# Patient Record
Sex: FEMALE | Race: OTHER | Employment: UNEMPLOYED | URBAN - METROPOLITAN AREA
[De-identification: names, ages, dates, MRNs, and addresses within clinical notes are randomized per-mention and may not be internally consistent; named-entity substitution may affect disease eponyms.]

---

## 2017-11-28 ENCOUNTER — OFFICE VISIT (OUTPATIENT)
Dept: INTERNAL MEDICINE CLINIC | Age: 37
End: 2017-11-28

## 2017-11-28 VITALS
HEART RATE: 86 BPM | OXYGEN SATURATION: 100 % | TEMPERATURE: 97.1 F | BODY MASS INDEX: 44.51 KG/M2 | DIASTOLIC BLOOD PRESSURE: 54 MMHG | WEIGHT: 283.6 LBS | RESPIRATION RATE: 24 BRPM | HEIGHT: 67 IN | SYSTOLIC BLOOD PRESSURE: 120 MMHG

## 2017-11-28 DIAGNOSIS — S41.151A: ICD-10-CM
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
## 2017-11-28 DIAGNOSIS — M54.6 ACUTE MIDLINE THORACIC BACK PAIN: ICD-10-CM

## 2017-11-28 DIAGNOSIS — Z23 NEED FOR TDAP VACCINATION: ICD-10-CM

## 2017-11-28 PROBLEM — E66.01 OBESITY, MORBID (HCC): Status: ACTIVE | Noted: 2017-11-28

## 2017-11-28 RX ORDER — RIZATRIPTAN BENZOATE 10 MG/1
TABLET, ORALLY DISINTEGRATING ORAL
Refills: 1 | COMMUNITY
Start: 2017-11-21

## 2017-11-28 RX ORDER — TIZANIDINE 4 MG/1
TABLET ORAL
Refills: 3 | COMMUNITY
Start: 2017-11-20

## 2017-11-28 RX ORDER — ESZOPICLONE 3 MG/1
TABLET, FILM COATED ORAL
Refills: 1 | COMMUNITY
Start: 2017-11-13

## 2017-11-28 RX ORDER — SULFAMETHOXAZOLE AND TRIMETHOPRIM 800; 160 MG/1; MG/1
1 TABLET ORAL 2 TIMES DAILY
Qty: 6 TAB | Refills: 0 | Status: SHIPPED | OUTPATIENT
Start: 2017-11-28 | End: 2017-12-01

## 2017-11-28 RX ORDER — METRONIDAZOLE 500 MG/1
500 TABLET ORAL 3 TIMES DAILY
Qty: 9 TAB | Refills: 0 | Status: SHIPPED | OUTPATIENT
Start: 2017-11-28 | End: 2017-12-01

## 2017-11-28 RX ORDER — FUROSEMIDE 40 MG/1
TABLET ORAL
Refills: 3 | COMMUNITY
Start: 2017-11-10

## 2017-11-28 RX ORDER — QUETIAPINE FUMARATE 200 MG/1
TABLET, FILM COATED ORAL
Refills: 2 | COMMUNITY
Start: 2017-11-03

## 2017-11-28 RX ORDER — GABAPENTIN 300 MG/1
CAPSULE ORAL
Refills: 11 | COMMUNITY
Start: 2017-10-28

## 2017-11-28 RX ORDER — TRAZODONE HYDROCHLORIDE 100 MG/1
TABLET ORAL
Refills: 2 | COMMUNITY
Start: 2017-11-10

## 2017-11-28 RX ORDER — IBUPROFEN 800 MG/1
TABLET ORAL
Refills: 1 | COMMUNITY
Start: 2017-11-21

## 2017-11-28 RX ORDER — LAMOTRIGINE 100 MG/1
TABLET ORAL
Refills: 2 | COMMUNITY
Start: 2017-11-09
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
## 2017-11-28 NOTE — PATIENT INSTRUCTIONS
Human Bites: Care Instructions  Your Care Instructions  The biggest danger from a human bite is that it might get infected. Usually the wound will not be stitched. Taking good care of your wound at home will help it heal and reduce your chance of infection. Your doctor may give you antibiotics to prevent infection and a tetanus shot if you have not had one in the last 5 years or do not know when you had your last one. Your wound may heal in less than a week, or it may take longer, depending on how bad it is. The larger it is, the longer it will take to heal.  The doctor has checked you carefully, but problems can develop later. If you notice any problems or new symptoms, get medical treatment right away. Follow-up care is a key part of your treatment and safety. Be sure to make and go to all appointments, and call your doctor if you are having problems. It's also a good idea to know your test results and keep a list of the medicines you take. How can you care for yourself at home? · If your doctor told you how to care for your wound, follow your doctor's instructions. If you did not get instructions, follow this general advice:  ¨ Wash the wound with clean water 2 times a day. Don't use hydrogen peroxide or alcohol, which can slow healing. ¨ You may cover the wound with a thin layer of petroleum jelly, such as Vaseline, and a nonstick bandage. ¨ Apply more petroleum jelly and replace the bandage as needed. · Your wound may itch or feel irritated. A little redness and swelling are normal. Do not scratch or rub the wound. · If your doctor prescribed antibiotics, take them as directed. Do not stop taking them just because you feel better. You need to take the full course of antibiotics. · Ask your doctor if you can take an over-the-counter pain medicine. When should you call for help? Call your doctor now or seek immediate medical care if:  ?  · The skin near the bite turns cold or pale or it changes color.   ? · You lose feeling in the area near the bite, or it feels numb or tingly. ? · You have trouble moving a limb near the bite. ? · You have symptoms of infection, such as:  ¨ Increased pain, swelling, warmth, or redness near the wound. ¨ Red streaks leading from the wound. ¨ Pus draining from the wound. ¨ A fever. ? · Blood soaks through the bandage. Oozing small amounts of blood is normal.   ? · Your pain is getting worse. ? Watch closely for changes in your health, and be sure to contact your doctor if you are not getting better as expected. Where can you learn more? Go to http://katelyn-sara.info/. Enter E545 in the search box to learn more about \"Human Bites: Care Instructions. \"  Current as of: 2017  Content Version: 11.4  © 4728-8553 [employer]. Care instructions adapted under license by [employer] (which disclaims liability or warranty for this information). If you have questions about a medical condition or this instruction, always ask your healthcare professional. [address] any warranty or liability for your use of this information. Tdap (Tetanus, Diphtheria, Pertussis) Vaccine: What You Need to Know  Why get vaccinated? Tetanus, diphtheria, and pertussis are very serious diseases. Tdap vaccine can protect us from these diseases. And Tdap vaccine given to pregnant women can protect  babies against pertussis. Tetanus (lockjaw) is rare in the [facility] today. It causes painful muscle tightening and stiffness, usually all over the body. · It can lead to tightening of muscles in the head and neck so you can't open your mouth, swallow, or sometimes even breathe. Tetanus kills about 1 out of 10 people who are infected even after receiving the best medical care. Diphtheria is also rare in the [place] today. It can cause a thick coating to form in the back of the throat.   · It can lead to breathing problems, heart failure, paralysis, and death. Pertussis (whooping cough) causes severe coughing spells, which can cause difficulty breathing, vomiting, and disturbed sleep. · It can also lead to weight loss, incontinence, and rib fractures. Up to 2 in 100 adolescents and 5 in 100 adults with pertussis are hospitalized or have complications, which could include pneumonia or death. These diseases are caused by bacteria. Diphtheria and pertussis are spread from person to person through secretions from coughing or sneezing. Tetanus enters the body through cuts, scratches, or wounds. Before vaccines, as many as 200,000 cases of diphtheria, 200,000 cases of pertussis, and hundreds of cases of tetanus were reported in the [place] each year. Since vaccination began, reports of cases for tetanus and diphtheria have dropped by about 99% and for pertussis by about 80%. Tdap vaccine  The Tdap vaccine can protect adolescents and adults from tetanus, diphtheria, and pertussis. One dose of Tdap is routinely given at age 6 or 15. People who did not get Tdap at that age should get it as soon as possible. Tdap is especially important for health care professionals and anyone having close contact with a baby younger than 12 months. Pregnant women should get a dose of Tdap during every pregnancy, to protect the  from pertussis. Infants are most at risk for severe, life-threatening complications from pertussis. Another vaccine, called Td, protects against tetanus and diphtheria, but not pertussis. A Td booster should be given every 10 years. Tdap may be given as one of these boosters if you have never gotten Tdap before. Tdap may also be given after a severe cut or burn to prevent tetanus infection. Your doctor or the person giving you the vaccine can give you more information. Tdap may safely be given at the same time as other vaccines.   Some people should not get this vaccine  · A person who has ever had a life-threatening allergic reaction after a previous dose of any diphtheria-, tetanus-, or pertussis-containing vaccine, OR has a severe allergy to any part of this vaccine, should not get Tdap vaccine. Tell the person giving the vaccine about any severe allergies. · Anyone who had coma or long repeated seizures within 7 days after a childhood dose of DTP or DTaP, or a previous dose of Tdap, should not get Tdap, unless a cause other than the vaccine was found. They can still get Td. · Talk to your doctor if you:  ¨ Have seizures or another nervous system problem. ¨ Had severe pain or swelling after any vaccine containing diphtheria, tetanus, or pertussis. ¨ Ever had a condition called Guillain-Barré Syndrome (GBS). ¨ Aren't feeling well on the day the shot is scheduled. Risks  With any medicine, including vaccines, there is a chance of side effects. These are usually mild and go away on their own. Serious reactions are also possible but are rare. Most people who get Tdap vaccine do not have any problems with it.   Mild problems following Tdap  (Did not interfere with activities)  · Pain where the shot was given (about 3 in 4 adolescents or 2 in 3 adults)  · Redness or swelling where the shot was given (about 1 person in 5)  · Mild fever of at least 100.4°F (up to about 1 in 25 adolescents or 1 in 100 adults)  · Headache (about 3 or 4 people in 10)  · Tiredness (about 1 person in 3 or 4)  · Nausea, vomiting, diarrhea, stomachache (up to 1 in 4 adolescents or 1 in 10 adults)  · Chills, sore joints (about 1 person in 10)  · Body aches (about 1 person in 3 or 4)  · Rash, swollen glands (uncommon)  Moderate problems following Tdap  (Interfered with activities, but did not require medical attention)  · Pain where the shot was given (up to 1 in 5 or 6)  · Redness or swelling where the shot was given (up to about 1 in 16 adolescents or 1 in 12 adults)  · Fever over 102°F (about 1 in 100 adolescents or 1 in 250 adults)  · Headache (about 1 in 7 adolescents or 1 in 10 adults)  · Nausea, vomiting, diarrhea, stomachache (up to 1 to 3 people in 100)  · Swelling of the entire arm where the shot was given (up to about 1 in 500)  Severe problems following Tdap  (Unable to perform usual activities; required medical attention)  · Swelling, severe pain, bleeding and redness in the arm where the shot was given (rare)  Problems that could happen after any vaccine:  · People sometimes faint after a medical procedure, including vaccination. Sitting or lying down for about 15 minutes can help prevent fainting, and injuries caused by a fall. Tell your doctor if you feel dizzy or have vision changes or ringing in the ears. · Some people get severe pain in the shoulder and have difficulty moving the arm where a shot was given. This happens very rarely. · Any medication can cause a severe allergic reaction. Such reactions from a vaccine are very rare, estimated at fewer than 1 in a million doses, and would happen within a few minutes to a few hours after the vaccination. As with any medicine, there is a very remote chance of a vaccine causing a serious injury or death. The safety of vaccines is always being monitored. For more information, visit: www.cdc.gov/vaccinesafety. What if there is a serious problem? What should I look for? · Look for anything that concerns you, such as signs of a severe allergic reaction, very high fever, or unusual behavior. Signs of a severe allergic reaction can include hives, swelling of the face and throat, difficulty breathing, a fast heartbeat, dizziness, and weakness. These would usually start a few minutes to a few hours after the vaccination. What should I do? · If you think it is a severe allergic reaction or other emergency that can't wait, call 9-1-1 or get the person to the nearest hospital. Otherwise, call your doctor.   · Afterward, the reaction should be reported to the Vaccine Adverse Event Reporting System (VAERS). Your doctor might file this report, or you can do it yourself through the VAERS web site at www.vaers. [facility].gov, or by calling 5-300.922.5026. VAERS does not give medical advice. The National Vaccine Injury Compensation Program  The National Vaccine Injury Compensation Program (VICP) is a federal program that was created to compensate people who may have been injured by certain vaccines. Persons who believe they may have been injured by a vaccine can learn about the program and about filing a claim by calling 1-780.900.9122 or visiting the [employer] website at www.[facility].gov/vaccinecompensation. There is a time limit to file a claim for compensation. How can I learn more? · Ask your doctor. He or she can give you the vaccine package insert or suggest other sources of information. · Call your local or state health department. · Contact the Centers for Disease Control and Prevention (CDC):  ¨ Call 1-800.818.5351 (1-800-CDC-INFO) or  ¨ Visit CDC's website at www.cdc.gov/vaccines  Vaccine Information Statement (Interim)  Tdap Vaccine  (2/24/15)  [address] [id number]-81  Department of Health and Human Services  Centers for Disease Control and Prevention  Many Vaccine Information Statements are available in [place]ian and other languages. See www.immunize.org/vis. Muchas hojas de información sobre vacunas están disponibles en español y en otros idiomas. Visite www.immunize.org/vis. Care instructions adapted under license by [employer] (which disclaims liability or warranty for this information). If you have questions about a medical condition or this instruction, always ask your healthcare professional. [place] [address] any warranty or liability for your use of this information. Back Pain: Care Instructions  Your Care Instructions    Back pain has many possible causes. It is often related to problems with muscles and ligaments of the back.  It may also be related to problems with the nerves, discs, or bones of the back. Moving, lifting, standing, sitting, or sleeping in an awkward way can strain the back. Sometimes you don't notice the injury until later. Arthritis is another common cause of back pain. Although it may hurt a lot, back pain usually improves on its own within several weeks. Most people recover in 12 weeks or less. Using good home treatment and being careful not to stress your back can help you feel better sooner. Follow-up care is a key part of your treatment and safety. Be sure to make and go to all appointments, and call your doctor if you are having problems. It's also a good idea to know your test results and keep a list of the medicines you take. How can you care for yourself at home? · Sit or lie in positions that are most comfortable and reduce your pain. Try one of these positions when you lie down:  ¨ Lie on your back with your knees bent and supported by large pillows. ¨ Lie on the floor with your legs on the seat of a sofa or chair. Brian Dys on your side with your knees and hips bent and a pillow between your legs. ¨ Lie on your stomach if it does not make pain worse. · Do not sit up in bed, and avoid soft couches and twisted positions. Bed rest can help relieve pain at first, but it delays healing. Avoid bed rest after the first day of back pain. · Change positions every 30 minutes. If you must sit for long periods of time, take breaks from sitting. Get up and walk around, or lie in a comfortable position. · Try using a heating pad on a low or medium setting for 15 to 20 minutes every 2 or 3 hours. Try a warm shower in place of one session with the heating pad. · You can also try an ice pack for 10 to 15 minutes every 2 to 3 hours. Put a thin cloth between the ice pack and your skin. · Take pain medicines exactly as directed. ¨ If the doctor gave you a prescription medicine for pain, take it as prescribed.   ¨ If you are not taking a prescription pain medicine, ask your doctor if you can take an over-the-counter medicine. · Take short walks several times a day. You can start with 5 to 10 minutes, 3 or 4 times a day, and work up to longer walks. Walk on level surfaces and avoid hills and stairs until your back is better. · Return to work and other activities as soon as you can. Continued rest without activity is usually not good for your back. · To prevent future back pain, do exercises to stretch and strengthen your back and stomach. Learn how to use good posture, safe lifting techniques, and proper body mechanics. When should you call for help? Call your doctor now or seek immediate medical care if:  ? · You have new or worsening numbness in your legs. ? · You have new or worsening weakness in your legs. (This could make it hard to stand up.)   ? · You lose control of your bladder or bowels. ? Watch closely for changes in your health, and be sure to contact your doctor if:  ? · Your pain gets worse. ? · You are not getting better after 2 weeks. Where can you learn more? Go to http://katelyn-sara.info/. Enter C573 in the search box to learn more about \"Back Pain: Care Instructions. \"  Current as of: March 21, 2017  Content Version: 11.4  © 1455-0651 [employer]. Care instructions adapted under license by [employer] (which disclaims liability or warranty for this information). If you have questions about a medical condition or this instruction, always ask your healthcare professional. [place] [address] any warranty or liability for your use of this information.
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
## 2020-04-01 ENCOUNTER — HOSPITAL ENCOUNTER (OUTPATIENT)
Dept: PHYSICAL THERAPY | Age: 40
Discharge: HOME | End: 2020-04-01
Payer: MEDICAID

## 2020-04-01 PROCEDURE — 97162 PT EVAL MOD COMPLEX 30 MIN: CPT | Performed by: PHYSICAL THERAPIST

## 2020-04-01 PROCEDURE — 97530 THERAPEUTIC ACTIVITIES: CPT | Performed by: PHYSICAL THERAPIST

## 2020-04-01 NOTE — PROGRESS NOTES
[facility] [address] [place] [address]  [facility], [address] [place] [address], [address]  Phone: [phone]  Fax: [phone] / STATEMENT OF MEDICAL NECESSITY FOR PHYSICAL THERAPY SERVICES  Patient Name: Josse Ann : 1980   Medical   Diagnosis: Neck pain [M54.2] Treatment Diagnosis: Neck Pain   Onset Date: > 5 years prior without an known incident     Referral Source: [facility]): 2020   Prior Hospitalization: See medical history Provider #: [id number]   Prior Level of Function: Unlimited standing/walking/sitting    Comorbidities: Bipolar disorder, obesity, anxiety, insomnia   Medications: Verified on Patient Summary List   The Plan of Care and following information is based on the information from the initial evaluation.   ===========================================================================================  Assessment / key information:  38 y/o female presents to PT with c/o neck and back pain for the past 5 years. She has been seeing pain management at [facility], and reports recently finding an oral opioid management regime. Pt rates her pain as 7/10 at times of evaluation. Postural inspection reveals forward head, rounded shoulder posture and forward lean of trunk in standing. She sits with a posterior pelvic tilt. Cervical AROM is painfully limited in in all directions to ~25% of normal.  Shoulder AROM is limited to 120 degrees of elevation bilaterally due to neck pain; functional IR/ER were full, but painful at end ranges. Lumbar AROM assessment revealed 50% flexion and bilateral sidebending, but extension was limited to 10% due to pain. Pt was educated in proper sitting posture to alleviate lumbar and cervical stresses when sitting. Ms Maia Ba has signs and symptoms suggestive of chronic neck and back pain related to underlying postural dysfunction.   Due to current pandemic, pt will not been seen formally at this time, but followed with periodic phone calls.  ===========================================================================================  Eval Complexity: History HIGH Complexity :3+ comorbidities / personal factors will impact the outcome/ POC ;  Examination  MEDIUM Complexity : 3 Standardized tests and measures addressing body structure, function, activity limitation and / or participation in recreation ; Presentation MEDIUM Complexity : Evolving with changing characteristics ; Decision Making MEDIUM Complexity : FOTO score of 26-74; Overall Complexity MEDIUM  Problem List: pain affecting function, decrease ROM, decrease strength, impaired gait/ balance, decrease ADL/ functional abilitiies, decrease activity tolerance, decrease flexibility/ joint mobility and decrease transfer abilities   Treatment Plan may include any combination of the following: Therapeutic exercise, Therapeutic activities, Neuromuscular re-education, Physical agent/modality, Gait/balance training, Manual therapy, Dry Needling, Aquatic therapy, Patient education, Self Care training, Functional mobility training, Home safety training and Stair training  Patient / Family readiness to learn indicated by: asking questions, trying to perform skills and interest  Persons(s) to be included in education: patient (P)  Barriers to Learning/Limitations: None  Measures taken:    Patient Goal (s): \"make pain a bit more manageable\"   Patient self reported health status: good  Rehabilitation Potential: good   Short Term Goals: To be accomplished in  2  weeks:  1. Pt to manage pain to </= 5/10 with postrual correction and current medicaitons. 2. Pt independent with basic postural correction, body mechanics, and core strengthening.  Long Term Goals: To be accomplished in  6  weeks:  1. Pt to increase FOTO score from 30 to >/= 44.  2. Pt to report ability to stand and perform ADLs with pain </= 3/10.   3. Pt independent with high level HEP for core strengthening/endurance, and body mechanics to manage symptoms. Frequency / Duration:   **Patient services to held at this time due to COVID-19 pandemic and patient will be contacted periodically via phone to monitor condition, with plan to start formal PT once determined safe **     Patient / Caregiver education and instruction: self care, activity modification and exercises    Therapist Signature: Jeff Pérez, PT Date: 4/7/7112   Certification Period: NA Time: 2:03 PM   ===========================================================================================  I certify that the above Physical Therapy Services are being furnished while the patient is under my care. I agree with the treatment plan and certify that this therapy is necessary. Physician Signature:        Date:       Time:     Please sign and return to [facility] [place] or you may fax the signed copy to [phone].   Thank you.  paige

## 2020-04-01 NOTE — PROGRESS NOTES
PHYSICAL THERAPY - DAILY TREATMENT NOTE    Patient Name: Blank Fill        Date: 2020  : 1980   YES Patient  Verified  Visit #:      of   12  Insurance: Payor: Kerwin Salguero / Plan: Fabi Broussard / Product Type: Managed Care Medicaid /      In time: 2:00 Out time: 2:55   Total Treatment Time: 55     BCBS/Medicare Time Tracking (below)   Total Timed Codes (min):  na 1:1 Treatment Time:  na     TREATMENT AREA =  Neck pain [M54.2]    SUBJECTIVE  Pain Level (on 0 to 10 scale):  7  / 10   Medication Changes/New allergies or changes in medical history, any new surgeries or procedures?     NO    If yes, update Summary List   Subjective Functional Status/Changes:  []  No changes reported     See eval/POC           Modalities Rationale:   NA   min [] Estim, type/location:                                      []  att     []  unatt     []  w/US     []  w/ice    []  w/heat    min []  Mechanical Traction: type/lbs                   []  pro   []  sup   []  int   []  cont    []  before manual    []  after manual    min []  Ultrasound, settings/location:      min []  Iontophoresis w/ dexamethasone, location:                                               []  take home patch       []  in clinic    min []  Ice     []  Heat    location/position:     min []  Vasopneumatic Device, press/temp:     min []  Other:    [] Skin assessment post-treatment (if applicable):    []  intact    []  redness- no adverse reaction     []redness - adverse reaction:        15 min Therapeutic Activity:  [x]  Sitting posture with lumbar roll and supporting UEs to unload C/T myofacsia   Rationale:      improve coordination to improve the patients ability to increase positional/movement tolerance       Billed With/As:   [] TE   [] TA   [] Neuro   [] Self Care Patient Education: [x] Review HEP    [] Progressed/Changed HEP based on:   [] positioning   [] body mechanics   [] transfers   [] heat/ice application    [] other:      Other Objective/Functional Measures:    FOTO - 30     Post Treatment Pain Level (on 0 to 10) scale:   7  / 10     ASSESSMENT  Assessment/Changes in Function:     Pt has signs and symptoms suegestive of chronic pain related to underlying obesity and postural dysfunction. []  See Progress Note/Recertification   Patient will continue to benefit from skilled PT services to modify and progress therapeutic interventions, address functional mobility deficits, address ROM deficits, address strength deficits, analyze and address soft tissue restrictions, analyze and cue movement patterns, analyze and modify body mechanics/ergonomics and assess and modify postural abnormalities to attain remaining goals. Progress toward goals / Updated goals:    Goals established today     PLAN  [x]  Upgrade activities as tolerated YES Continue plan of care   []  Discharge due to :    []  Other:      Therapist: Tamir Sullivan PT    Date: 4/1/2020 Time: 2:04 PM     No future appointments.

## 2020-07-01 ENCOUNTER — HOSPITAL ENCOUNTER (OUTPATIENT)
Dept: PHYSICAL THERAPY | Age: 40
Discharge: HOME | End: 2020-07-01
Payer: MEDICAID

## 2020-07-01 PROCEDURE — 97110 THERAPEUTIC EXERCISES: CPT | Performed by: PHYSICAL THERAPIST

## 2020-07-01 PROCEDURE — 97162 PT EVAL MOD COMPLEX 30 MIN: CPT | Performed by: PHYSICAL THERAPIST

## 2020-07-01 NOTE — PROGRESS NOTES
PHYSICAL THERAPY - DAILY TREATMENT NOTE    Patient Name: Taylor Castillo        Date: 2020  : 1980   YES Patient  Verified  Visit #:      12  Insurance: Payor: Bunny Krabbe / Plan: [email or web address] / Product Type: Managed Care Medicaid /      In time: 3:50 Out time: 4:50   Total Treatment Time: 60     BCBS/Medicare Time Tracking (below)   Total Timed Codes (min):  na 1:1 Treatment Time:  na     TREATMENT AREA =  Neck pain [M54.2]    SUBJECTIVE  Pain Level (on 0 to 10 scale):  6-9  / 10   Medication Changes/New allergies or changes in medical history, any new surgeries or procedures?     NO    If yes, update Summary List   Subjective Functional Status/Changes:  []  No changes reported     See eval/POC           Modalities Rationale:     decrease pain and increase tissue extensibility to improve patient's ability to prevent soreness   min [] Estim, type/location:                                      []  att     []  unatt     []  w/US     []  w/ice    []  w/heat    min []  Mechanical Traction: type/lbs                   []  pro   []  sup   []  int   []  cont    []  before manual    []  after manual    min []  Ultrasound, settings/location:      min []  Iontophoresis w/ dexamethasone, location:                                               []  take home patch       []  in clinic   10 min []  Ice     [x]  Heat    location/position: Neck/Back - reclined after session    min []  Vasopneumatic Device, press/temp:     min []  Other:    [] Skin assessment post-treatment (if applicable):    []  intact    []  redness- no adverse reaction     []redness  adverse reaction:        15 min Therapeutic Exercise:  [x]  See flow sheet   Rationale:      increase ROM, increase strength and improve coordination to improve the patients ability to self-correct posture       Billed With/As:   [] TE   [] TA   [] Neuro   [] Self Care Patient Education: [x] Review HEP    [] Progressed/Changed HEP based on:   [] positioning   [] body mechanics   [] transfers   [] heat/ice application    [] other:      Other Objective/Functional Measures:    FOTO - 29     Post Treatment Pain Level (on 0 to 10) scale:   6  / 10     ASSESSMENT  Assessment/Changes in Function:     Pt has chronic neck/back pain related to deconditioning and postural dysfunction. []  See Progress Note/Recertification   Patient will continue to benefit from skilled PT services to modify and progress therapeutic interventions, address functional mobility deficits, address ROM deficits, address strength deficits, analyze and address soft tissue restrictions, analyze and cue movement patterns, analyze and modify body mechanics/ergonomics and assess and modify postural abnormalities to attain remaining goals.    Progress toward goals / Updated goals:    Goals established today     PLAN  [x]  Upgrade activities as tolerated YES Continue plan of care   []  Discharge due to :    []  Other:      Therapist: Jaspal Hernandez PT    Date: 7/1/2020 Time: 9:28 AM     Future Appointments   Date Time Provider Airam Cardona   7/1/2020  3:45 PM Fernie Cohen PT [facility]

## 2020-07-01 NOTE — PROGRESS NOTES
[address] [facility] AT [address] [place] [address]  [address]mans [address], [address] ,[address], [address]  Phone: [phone]  Fax: [phone] / [address] [facility]  Patient Name: Pauline Jones : 1980   Medical   Diagnosis: Neck pain [M54.2] Treatment Diagnosis: Neck Pain   Onset Date: Chronic     Referral Source: Lynnette Buerger, FNP Start of [facility]): 2020   Prior Hospitalization: See medical history Provider #: [id number]   Prior Level of Function: Unlimited standing/walking/sitting    Comorbidities: Bipolar disorder, obesity, anxiety, insomnia   Medications: Verified on Patient Summary List   The Plan of Care and following information is based on the information from the initial evaluation.   ===========================================================================================  Assessment / key information:  36 y/o female presents to PT with c/oneck and back pain for the past 5 years. She has been seeing pain management at [facility], and reports recently finding an oral opioid management regime. Pt rates her pain as 6-9/10 at times of evaluation. Postural inspection reveals forward head, rounded shoulder posture and forward lean of trunk in standing. She sits with a posterior pelvic tilt. Cervical AROM is painfully limited in in all directions to ~25% of normal.  Shoulder AROM is limited to 120 degrees of elevation bilaterally due to neck pain; functional IR/ER were full, but painful at end ranges. Lumbar AROM assessment revealed 50% flexion and bilateral sidebending, but extension was limited to 10% due to pain. She does not tolerate being supine, so mechanical cervical traction may not be practical.  Pt was educated in proper sitting posture to alleviate lumbar and cervical stresses when sitting.   Ms Bruce Tadeo has signs and symptoms suggestive of chronic neck and back pain related to underlying postural dysfunction.   ===========================================================================================  Eval Complexity: History HIGH Complexity :3+ comorbidities / personal factors will impact the outcome/ POC ;  Examination  MEDIUM Complexity : 3 Standardized tests and measures addressing body structure, function, activity limitation and / or participation in recreation ; Presentation MEDIUM Complexity : Evolving with changing characteristics ; Decision Making HIGH Complexity : FOTO score of 1- 25 ; Overall Complexity MEDIUM  Problem List: pain affecting function, decrease ROM, decrease strength, decrease ADL/ functional abilitiies, decrease activity tolerance, decrease flexibility/ joint mobility and decrease transfer abilities   Treatment Plan may include any combination of the following: Therapeutic exercise, Therapeutic activities, Neuromuscular re-education, Physical agent/modality, Gait/balance training, Manual therapy, Dry Needling, Aquatic therapy, Patient education, Self Care training, Functional mobility training, Home safety training and Stair training  Patient / Family readiness to learn indicated by: asking questions, trying to perform skills and interest  Persons(s) to be included in education: patient (P)  Barriers to Learning/Limitations: None  Measures taken:    Patient Goal (s): \"make pain a bit more manageable\"   Patient self reported health status: good  Rehabilitation Potential: good   Short Term Goals: To be accomplished in  2  weeks:  1. Pt to manage pain to </= 5/10 with postrual correction and current medicaitons. 2. Pt independent with basic postural correction, body mechanics, and core strengthening.  Long Term Goals: To be accomplished in  6  weeks:  1. Pt to increase FOTO score from 30 to >/= 44.  2. Pt to report ability to stand and perform ADLs with pain </= 3/10.   3. Pt independent with high level HEP for core strengthening/endurance, and body mechanics to manage symptoms. Frequency / Duration:   Patient to be seen  2  times per week for 6  weeks:  Patient / Caregiver education and instruction: self care, activity modification and exercises    Therapist Signature: Cici Castaneda PT Date: 8/7/0443   Certification Period: NA Time: 9:27 AM   ===========================================================================================  I certify that the above Physical Therapy Services are being furnished while the patient is under my care. I agree with the treatment plan and certify that this therapy is necessary. Physician Signature:        Date:       Time:     Please sign and return to [facility] or you may fax the signed copy to [phone]. Thank you.

## 2020-07-06 ENCOUNTER — HOSPITAL ENCOUNTER (OUTPATIENT)
Dept: PHYSICAL THERAPY | Age: 40
Discharge: HOME | End: 2020-07-06
Payer: MEDICAID

## 2020-07-06 PROCEDURE — 97110 THERAPEUTIC EXERCISES: CPT

## 2020-07-06 PROCEDURE — 97112 NEUROMUSCULAR REEDUCATION: CPT

## 2020-07-06 PROCEDURE — 97530 THERAPEUTIC ACTIVITIES: CPT

## 2020-07-08 ENCOUNTER — HOSPITAL ENCOUNTER (OUTPATIENT)
Dept: PHYSICAL THERAPY | Age: 40
End: 2020-07-08
Payer: MEDICAID

## 2020-07-13 ENCOUNTER — HOSPITAL ENCOUNTER (OUTPATIENT)
Dept: PHYSICAL THERAPY | Age: 40
Discharge: HOME | End: 2020-07-13
Payer: MEDICAID

## 2020-07-13 PROCEDURE — 97112 NEUROMUSCULAR REEDUCATION: CPT

## 2020-07-13 PROCEDURE — 97014 ELECTRIC STIMULATION THERAPY: CPT

## 2020-07-13 PROCEDURE — 97110 THERAPEUTIC EXERCISES: CPT

## 2020-07-13 NOTE — PROGRESS NOTES
PHYSICAL THERAPY - DAILY TREATMENT NOTE    Patient Name: Ishmael Jenkins        Date: 2020  : 1980   YES Patient  Verified  Visit #:   3   of   12  Insurance: Payor: Hollie Tellez / Plan: Abebe Gandhi / Product Type: Managed Care Medicaid /      In time: 1:15 P Out time: 2:10 P   Total Treatment Time: 40     BCBS/Medicare Time Tracking (below)   Total Timed Codes (min):  NA 1:1 Treatment Time:  NA     TREATMENT AREA = Neck pain [M54.2]    SUBJECTIVE  Pain Level (on 0 to 10 scale):  9.5  / 10 in l/s  7/10 in c/s   Medication Changes/New allergies or changes in medical history, any new surgeries or procedures? NO    If yes, update Summary List   Subjective Functional Status/Changes:  []  No changes reported     Patient reports that she has most of her pain in her lower back today, some pain in R leg & numbness in her R foot, she can only sleep 2 hours at a time, the pain meds \"takes the edge off\" of her pain. Dr. Vivienne Saha told her that she would need a l/s fusion, she did not want the PATY.         Modalities Rationale:     decrease pain to improve patient's ability to return to pain-free sitting  10 min [] Estim, type/location:  IFC to l/s in supported sitting with l/s roll                                     []  att     [x]  unatt     []  w/US     []  w/ice    []  w/heat    min []  Mechanical Traction: type/lbs                   []  pro   []  sup   []  int   []  cont    []  before manual    []  after manual    min []  Ultrasound, settings/location:      min []  Iontophoresis w/ dexamethasone, location:                                               []  take home patch       []  in clinic    min []  Ice     []  Heat    location/position:     min []  Vasopneumatic Device, press/temp:     min []  Other:    [] Skin assessment post-treatment (if applicable):    [x]  intact    []  redness- no adverse reaction     []redness  adverse reaction:        20 min Therapeutic Exercise:  [x]  See flow sheet Rationale:      increase ROM and increase strength to improve the patients ability to return to pain-free standing     10 min Therapeutic Activity: [x]? See flow sheet   Rationale:    increase ROM and increase strength to improve the patients ability to perform bed mobility & transfers with manageable symptoms in back      Billed With/As:   [] TE   [] TA   [] Neuro   [] Self Care Patient Education: [x] Review HEP    [] Progressed/Changed HEP based on:   [] positioning   [] body mechanics   [] transfers   [] heat/ice application    [] other:      Other Objective/Functional Measures: Added therapeutic exercise per flow sheet     TA: reviewed transfer training from supine to sit (improved tolerance to supine to L S/L to transfer to sitting position, supervision for safety, difficulty with active movements of R LE due to  Pain)     Post Treatment Pain Level (on 0 to 10) scale:   7  / 10 in l/s,   6/10 in c/s     ASSESSMENT  Assessment/Changes in Function:     Improved tolerance to supine lying therapeutic exercise, able to lay fully supine with wedge, although improved tolerance to trial of ESTIM in sitting today, patient requires min A to lift R LE to place on plinth & perform transfers     []  See Progress Note/Recertification   Patient will continue to benefit from skilled PT services to modify and progress therapeutic interventions, address functional mobility deficits, address ROM deficits, address strength deficits, analyze and address soft tissue restrictions, analyze and cue movement patterns, analyze and modify body mechanics/ergonomics, assess and modify postural abnormalities, address imbalance/dizziness and instruct in home and community integration to attain remaining goals.    Progress toward goals / Updated goals:    Progressing towards STG 1     PLAN  []  Upgrade activities as tolerated YES Continue plan of care   []  Discharge due to :    []  Other:      Therapist: Taya Magallon, PT    Date: 7/13/2020 Time: 2:32 PM     Future Appointments   Date Time Provider Airam Cardona   7/16/2020  2:30 PM Catherine Kent [facility]   7/20/2020  9:30 AM Chas Gee, ARIEL [facility]   7/23/2020  2:30 PM Catherine Kent [facility]   7/27/2020 12:30 PM Chas Gee, ARIEL [facility]   7/30/2020  2:30 PM Catherine Kent [facility]

## 2020-07-14 ENCOUNTER — APPOINTMENT (OUTPATIENT)
Dept: PHYSICAL THERAPY | Age: 40
End: 2020-07-14
Payer: MEDICAID

## 2020-07-16 ENCOUNTER — HOSPITAL ENCOUNTER (OUTPATIENT)
Dept: PHYSICAL THERAPY | Age: 40
Discharge: HOME | End: 2020-07-16
Payer: MEDICAID

## 2020-07-16 PROCEDURE — 97110 THERAPEUTIC EXERCISES: CPT

## 2020-07-16 PROCEDURE — 97112 NEUROMUSCULAR REEDUCATION: CPT

## 2020-07-16 NOTE — PROGRESS NOTES
PHYSICAL THERAPY - DAILY TREATMENT NOTE    Patient Name: Mario Rogers        Date: 2020  : 1980   YES Patient  Verified  Visit #:   5   of   12  Insurance: Payor: Sg Oliveros / Plan: [id number] / Product Type: Managed Care Medicaid /      In time: 493 Out time: 320   Total Treatment Time: 30     BCBS/Medicare Time Tracking (below)   Total Timed Codes (min):  NA 1:1 Treatment Time:  NA     TREATMENT AREA =  Neck pain [M54.2]    SUBJECTIVE  Pain Level (on 0 to 10 scale):  -7  / 10   Medication Changes/New allergies or changes in medical history, any new surgeries or procedures? NO    If yes, update Summary List   Subjective Functional Status/Changes:  []  No changes reported     Patient reports that her lower back was in extreme pain after trying to lay on her back to the exercises last time and she prefers to lay down today at all during therapy. She also reports that she has been awake since 430 AM and has been running errands since 6AM and only got 2 hours of sleep because of her insomnia and is pretty tired.   She reports that she took allergy medicine, a mm relaxer, and narcotic pain medication 1-2 hours prior to therapy (unable to fully say when she took medications.)            Modalities Rationale: PD    min [] Estim, type/location:                                      []  att     []  unatt     []  w/US     []  w/ice    []  w/heat    min []  Mechanical Traction: type/lbs                   []  pro   []  sup   []  int   []  cont    []  before manual    []  after manual    min []  Ultrasound, settings/location:      min []  Iontophoresis w/ dexamethasone, location:                                               []  take home patch       []  in clinic    min []  Ice     []  Heat    location/position:     min []  Vasopneumatic Device, press/temp:     min []  Other:    [x] Skin assessment post-treatment (if applicable):    [x]  intact    [x]  redness- no adverse reaction     []redness  adverse reaction:        15 min Therapeutic Exercise:  [x]  See flow sheet   Rationale:      increase ROM, increase strength, improve coordination, improve balance and increase proprioception to improve the patients ability to perform unlimited ADLs          15 min Neuromuscular Re-ed: [x]  See flow sheet; postural exercises   Rationale:    increase ROM, increase strength, improve coordination, improve balance and increase proprioception to improve the patients ability to perform unlimited ADls    Billed With/As:   [] TE   [] TA   [] Neuro   [] Self Care Patient Education: [x] Review HEP    [] Progressed/Changed HEP based on:   [] positioning   [] body mechanics   [] transfers   [] heat/ice application    [] other:      Other Objective/Functional Measures:    Performed all exercises in sitting or standing today per patient request, difficulty with scapular retraction today require max cuing. Patient did require constant cuing to maintain alert state and did report \"micro sleeping\" while performing corner stretch. She require close supervision throughout all exercises, particularly in standing, for safety. Patient did not drive home from therapy and reports she does not drive due to her frquently falling asleep. She was picke dup from therapy by her ,      Post Treatment Pain Level (on 0 to 10) scale:   6  / 10     ASSESSMENT  Assessment/Changes in Function:     Patient fatigues quickly with all postural strengthening exercises.       []  See Progress Note/Recertification   Patient will continue to benefit from skilled PT services to modify and progress therapeutic interventions, address functional mobility deficits, address ROM deficits, address strength deficits, analyze and address soft tissue restrictions, analyze and cue movement patterns, analyze and modify body mechanics/ergonomics, assess and modify postural abnormalities, address imbalance/dizziness and instruct in home and community integration to attain remaining goals. Progress toward goals / Updated goals:    Progressing towards LTG 2.       PLAN  [x]  Upgrade activities as tolerated YES Continue plan of care   []  Discharge due to :    []  Other:      Therapist: Chivo Davila    Date: 7/16/2020 Time: 4:56 PM     Future Appointments   Date Time Provider Airam Cardona   7/20/2020  9:30 AM Emmett Epstein PT [facility]   7/23/2020  2:30 PM Clifford Elizabeth [facility]   7/27/2020 12:30 PM Emmett Epstein PT [facility]   7/30/2020  2:30 PM Clifford Elizabeth [facility]

## 2020-07-20 ENCOUNTER — APPOINTMENT (OUTPATIENT)
Dept: PHYSICAL THERAPY | Age: 40
End: 2020-07-20
Payer: MEDICAID

## 2020-07-21 ENCOUNTER — APPOINTMENT (OUTPATIENT)
Dept: PHYSICAL THERAPY | Age: 40
End: 2020-07-21
Payer: MEDICAID

## 2020-07-23 ENCOUNTER — HOSPITAL ENCOUNTER (OUTPATIENT)
Dept: PHYSICAL THERAPY | Age: 40
Discharge: HOME | End: 2020-07-23
Payer: MEDICAID

## 2020-07-28 ENCOUNTER — APPOINTMENT (OUTPATIENT)
Dept: PHYSICAL THERAPY | Age: 40
End: 2020-07-28
Payer: MEDICAID

## 2020-07-30 ENCOUNTER — HOSPITAL ENCOUNTER (OUTPATIENT)
Dept: PHYSICAL THERAPY | Age: 40
Discharge: HOME | End: 2020-07-30
Payer: MEDICAID

## 2020-07-30 PROCEDURE — 97014 ELECTRIC STIMULATION THERAPY: CPT

## 2020-07-30 PROCEDURE — 97112 NEUROMUSCULAR REEDUCATION: CPT

## 2020-07-30 PROCEDURE — 97110 THERAPEUTIC EXERCISES: CPT

## 2020-07-30 NOTE — PROGRESS NOTES
PHYSICAL THERAPY - DAILY TREATMENT NOTE    Patient Name: Lucas Longest        Date: 2020  : 1980   YES Patient  Verified  Visit #:   6   of   12  Insurance: Payor: Pete Lares / Plan: Marianela Solorio / Product Type: Managed Care Medicaid /      In time: 230 Out time: 315   Total Treatment Time: 45     BCBS/Medicare Time Tracking (below)   Total Timed Codes (min):  NA 1:1 Treatment Time:  NA     TREATMENT AREA =  Neck pain [M54.2]    SUBJECTIVE  Pain Level (on 0 to 10 scale):  8  / 10   Medication Changes/New allergies or changes in medical history, any new surgeries or procedures? NO    If yes, update Summary List   Subjective Functional Status/Changes:  []  No changes reported     Patient reports that she woke up with excruciating foot pain on the  and went to the ER on . She said x-rays were negative and she is WBAT on crutches. She states that her back pain is getting worse while being on the crutches.             Modalities Rationale:     decrease pain to improve patient's ability to perform pain free ADLs  10 min [x] Estim, type/location: IFC to lumbar spine in sitting                                     []  att     [x]  unatt     []  w/US     []  w/ice    [x]  w/heat    min []  Mechanical Traction: type/lbs                   []  pro   []  sup   []  int   []  cont    []  before manual    []  after manual    min []  Ultrasound, settings/location:      min []  Iontophoresis w/ dexamethasone, location:                                               []  take home patch       []  in clinic    min []  Ice     []  Heat    location/position:     min []  Vasopneumatic Device, press/temp:     min []  Other:    [x] Skin assessment post-treatment (if applicable):    [x]  intact    [x]  redness- no adverse reaction     []redness  adverse reaction:        20 min Therapeutic Exercise:  [x]  See flow sheet   Rationale:      increase ROM, increase strength, improve coordination, improve balance and increase proprioception to improve the patients ability to perform unlimited ADLs    15 min Neuromuscular Re-ed: [x]? See flow sheet; postural exercises   Rationale:    increase ROM, increase strength, improve coordination, improve balance and increase proprioception to improve the patients ability to perform unlimited ADLs      Billed With/As:   [] TE   [] TA   [] Neuro   [] Self Care Patient Education: [x] Review HEP    [] Progressed/Changed HEP based on:   [] positioning   [] body mechanics   [] transfers   [] heat/ice application    [] other:      Other Objective/Functional Measures:    Notable improved tolerance to all exercises today. Post Treatment Pain Level (on 0 to 10) scale:   6  / 10     ASSESSMENT  Assessment/Changes in Function:     Patient is making steady progress with PT rx and remains limited in positions she can perform exercises (unable to perform standing today and defers all lying down)     []  See Progress Note/Recertification   Patient will continue to benefit from skilled PT services to modify and progress therapeutic interventions, address functional mobility deficits, address ROM deficits, address strength deficits, analyze and address soft tissue restrictions, analyze and cue movement patterns, analyze and modify body mechanics/ergonomics, assess and modify postural abnormalities, address imbalance/dizziness and instruct in home and community integration to attain remaining goals. Progress toward goals / Updated goals:    Progressing towards LTG 2.       PLAN  [x]  Upgrade activities as tolerated YES Continue plan of care   []  Discharge due to :    []  Other:      Therapist: Chivo Davila    Date: 7/30/2020 Time: 3:22 PM     Future Appointments   Date Time Provider Airam Cardona   8/3/2020  9:45 AM Clifford Elizabeth [facility]   8/5/2020  5:15 PM Clifford Elizabeth [facility]

## 2020-08-03 ENCOUNTER — HOSPITAL ENCOUNTER (OUTPATIENT)
Dept: PHYSICAL THERAPY | Age: 40
Discharge: HOME | End: 2020-08-03
Payer: MEDICAID

## 2020-08-03 PROCEDURE — 97110 THERAPEUTIC EXERCISES: CPT

## 2020-08-03 PROCEDURE — 97014 ELECTRIC STIMULATION THERAPY: CPT

## 2020-08-03 PROCEDURE — 97112 NEUROMUSCULAR REEDUCATION: CPT

## 2020-08-03 PROCEDURE — 97530 THERAPEUTIC ACTIVITIES: CPT

## 2020-08-03 NOTE — PROGRESS NOTES
PHYSICAL THERAPY - DAILY TREATMENT NOTE    Patient Name: Yasmin Lynch        Date: 8/3/2020  : 1980   YES Patient  Verified  Visit #:     Insurance: Payor: Devang Vee / Plan: payever / Product Type: Managed Care Medicaid /      In time: 207 Out time: 6180   Total Treatment Time: 55     BCBS/Medicare Time Tracking (below)   Total Timed Codes (min):  NA 1:1 Treatment Time:  NA     TREATMENT AREA =  Neck pain [M54.2]    SUBJECTIVE  Pain Level (on 0 to 10 scale):  -7  / 10   Medication Changes/New allergies or changes in medical history, any new surgeries or procedures? NO    If yes, update Summary List   Subjective Functional Status/Changes:  []  No changes reported     Patient reports that her foot is better, she doesn't need crutches. She reports that she is feeling a little better since starting therapy.             Modalities Rationale:     decrease pain to improve patient's ability to perform pain free transfers  10 min [x] Estim, type/location: IFC to lumbar spine in siting, MHP to c/s in sitting                                     []  att     [x]  unatt     []  w/US     []  w/ice    [x]  w/heat    min []  Mechanical Traction: type/lbs                   []  pro   []  sup   []  int   []  cont    []  before manual    []  after manual    min []  Ultrasound, settings/location:      min []  Iontophoresis w/ dexamethasone, location:                                               []  take home patch       []  in clinic    min []  Ice     []  Heat    location/position:     min []  Vasopneumatic Device, press/temp:     min []  Other:    [x] Skin assessment post-treatment (if applicable):    [x]  intact    [x]  redness- no adverse reaction     []redness  adverse reaction:        15 min Therapeutic Exercise:  [x]  See flow sheet   Rationale:      increase ROM, increase strength, improve coordination, improve balance and increase proprioception to improve the patients ability to perform pain free ADLs        15 min Therapeutic Activity: -  See flow sheet, review of use of lumbar roll in sitting, review of use of c/s spine roll in supine for postural support. Rationale:    increase ROM, increase strength, improve coordination, improve balance and increase proprioception to improve the patients ability to perform pain free OH lifting    15 min Neuromuscular Re-ed: [x]  See flow sheet   Rationale:    increase ROM, increase strength, improve coordination, improve balance and increase proprioception to improve the patients ability to perform unlimited ADLs      Billed With/As:   [] TE   [] TA   [] Neuro   [] Self Care Patient Education: [x] Review HEP    [] Progressed/Changed HEP based on:   [] positioning   [] body mechanics   [] transfers   [] heat/ice application    [] other:      Other Objective/Functional Measures: Added seated upper trap stretch  Added theraband extension     Post Treatment Pain Level (on 0 to 10) scale:   5  / 10     ASSESSMENT  Assessment/Changes in Function:     Patient is making steady progress with postural strengthening exercises but requires moderate cuing for form throughout exercises. []  See Progress Note/Recertification   Patient will continue to benefit from skilled PT services to modify and progress therapeutic interventions, address functional mobility deficits, address ROM deficits, address strength deficits, analyze and address soft tissue restrictions, analyze and cue movement patterns, analyze and modify body mechanics/ergonomics, assess and modify postural abnormalities, address imbalance/dizziness and instruct in home and community integration to attain remaining goals. Progress toward goals / Updated goals:    Progressing towards LTG 2.      PLAN  [x]  Upgrade activities as tolerated YES Continue plan of care   []  Discharge due to :    []  Other:      Therapist: Angela Grace    Date: 8/3/2020 Time: 11:18 AM     Future Appointments Date Time Provider Airam Cardona   8/5/2020  5:15 PM Carol Jackson [facility]

## 2020-08-03 NOTE — PROGRESS NOTES
[address] [facility] PHYSICAL THERAPY AT [address] [place] [address]  [address], [address] ,[address], [address]  Phone: [phone]  Fax: [phone]  PROGRESS NOTE  Patient Name: Petrona Stuart : 1980   Treatment/Medical Diagnosis: Neck pain [M54.2]   Referral Source: Ulysses Pian, [address]     Date of Initial Visit: 2020 Attended Visits: 7 Missed Visits: 0     SUMMARY OF TREATMENT  Therapeutic exercise to increase strength, flexibility, range of motion. NEuromuscular re-education to improve posture and postural control as well as proprioceptive awareness of the body. Therapeutic activity to improve ease with sitting, pushing, overhead motion and ADLs. CURRENT STATUS  Ms. Mireles is currently being seen for LBP and c/s pain. She reports overall some relief with physical therapy but continues to have an average pain of 8-9/10, maximum pain 10/10, and pain at its least is a 5/10. She reports that pain in her neck and back is constant. In therapy is she unable to assume hooklying, supine, and semi reclined so trial of traction has not been able to be initiated. She is tolerating all gentle postural strengthening exercises but fatigues quickly. Please see below for progress towards STG:      Goal/Measure of Progress Goal Met? 1.  Pt to manage pain to </= 5/10 with postrual correction and current medicaitons. Status at last Eval: 9/10 Current Status: 8-9/10 progressing   2. Pt independent with basic postural correction, body mechanics, and core strengthening. Status at last Eval: - Current Status: Requires cuing throughout the day. progressing     New Goals to be achieved in __4__  weeks:  1. Pt to increase FOTO score from 30 to >/= 44.   2.   Pt to report ability to stand and perform ADLs with pain </= 3/10. 3.  Pt independent with high level HEP for core strengthening/endurance, and body mechanics to manage symptoms.        RECOMMENDATIONS  Continue PT rx 1-2x a week for 4 weeks to work towards LT. If you have any questions/comments please contact us directly at [phone]. Thank you for allowing us to assist in the care of your patient. Therapist Signature: Billie Boxer PT DPT  Date: 8/3/2020     Time: 10:55 AM   NOTE TO PHYSICIAN:  PLEASE COMPLETE THE ORDERS BELOW AND FAX TO   [facility]: [phone]. If you are unable to process this request in 24 hours please contact our office: [phone].    ___ I have read the above report and request that my patient continue as recommended.   ___ I have read the above report and request that my patient continue therapy with the following changes/special instructions:_________________________________________________________   ___ I have read the above report and request that my patient be discharged from therapy.      Physician Signature:        Date:       Time:

## 2020-08-05 ENCOUNTER — APPOINTMENT (OUTPATIENT)
Dept: PHYSICAL THERAPY | Age: 40
End: 2020-08-05
Payer: MEDICAID

## 2020-10-06 NOTE — PROGRESS NOTES
[facility] PHYSICAL THERAPY AT [address] [place] [address]  [address], [address] [place] [address] ,[address], [address]  Phone: [phone]  Fax: [phone] FOR PHYSICAL THERAPY          Patient Name: Yoel Sandoval : 1980   Treatment/Medical Diagnosis: Neck pain [M54.2]   Onset Date: chronic    Referral Source: MODESTO Gonzalez Start of [facility]): 2020   Prior Hospitalization: See Medical History Provider #: [id number]   Prior Level of Function: Unlimited stalking/ walking/ sitting    Comorbidities: Bipolar disorder, insomnia   Medications: Verified on Patient Summary List   Visits from [facility]: 7 Missed Visits: 3       Key Functional Changes/Progress: Ms. Bruce Cabrales was last seen on 8/3/2020 where her last progress note and assessment was performed. Patient did not schedule further follow up appointments and her current status is unknown. Assessment towards LTG set on last progress note is unable to be made at this point in time and patient will be DC from PT . Assessments/Recommendations: Discontinue therapy due to lack of attendance or compliance. If you have any questions/comments please contact us directly at [phone]. Thank you for allowing us to assist in the care of your patient. Therapist Signature: Angela Mims PT DPT  Date: 10/6/2020   Reporting Period: NA  Time: 4:46 PM     Physician Signature:                                                         Date:                                    Time:                                 Please sign and return to [facility] or you may fax the signed copy to [phone]. Thank you.
